# Patient Record
(demographics unavailable — no encounter records)

---

## 2025-06-26 NOTE — HEALTH RISK ASSESSMENT
[No] : In the past 12 months have you used drugs other than those required for medical reasons? No [0] : 2) Feeling down, depressed, or hopeless: Not at all (0) [PHQ-2 Negative - No further assessment needed] : PHQ-2 Negative - No further assessment needed [With Family] : lives with family [Employed] : employed [] :  [Sexually Active] : sexually active [Very Good] : ~his/her~ current health as very good [Good] : ~his/her~  mood as  good [No falls in past year] : Patient reported no falls in the past year [de-identified] : see hpi  [de-identified] : NO exercise [de-identified] : Not super healthy. Tries to control portions. Skips breakfast, lunch: takeout from restaurant, dinner: sometimes skips as well. What the kids eat--pasta. Salad. [[id number]] : 0 [Never] : Never [Patient reported mammogram was normal] : Patient reported mammogram was normal [Patient reported PAP Smear was normal] : Patient reported PAP Smear was normal [HIV test declined] : HIV test declined [Hepatitis C test declined] : Hepatitis C test declined [Change in mental status noted] : No change in mental status noted [None] : None [# Of Children ___] : has [unfilled] children [High Risk Behavior] : no high risk behavior [Feels Safe at Home] : Feels safe at home [Fully functional (bathing, dressing, toileting, transferring, walking, feeding)] : Fully functional (bathing, dressing, toileting, transferring, walking, feeding) [Fully functional (using the telephone, shopping, preparing meals, housekeeping, doing laundry, using] : Fully functional and needs no help or supervision to perform IADLs (using the telephone, shopping, preparing meals, housekeeping, doing laundry, using transportation, managing medications and managing finances) [Reports changes in hearing] : Reports no changes in hearing [Reports changes in vision] : Reports no changes in vision [Reports changes in dental health] : Reports no changes in dental health [Smoke Detector] : smoke detector [Carbon Monoxide Detector] : carbon monoxide detector [Seat Belt] :  uses seat belt [Sunscreen] : uses sunscreen [MammogramDate] : 11/24 [MammogramComments] : by outside GYN [PapSmearDate] : 5/25 [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

## 2025-06-26 NOTE — ASSESSMENT
[Vaccines Reviewed] : Immunizations reviewed today. Please see immunization details in the vaccine log within the immunization flowsheet.  [FreeTextEntry1] : Ms. LEIGH ANN LILLY is a 40 year old female  with history of   BRANDI, fatigue, cold sore presented today for comprehensive evaluation.  #HCM: -mammogram: 11/1/24 Birads 1, denies breast pain, nipple discharge, lymph node issue, breast skin changes,  - Pap smear: P3,  LMP 6/12/25, flows 4-5 days, seen by Dr. Plaza, SAJAN 5/2025. Per pt, PAP/HPV negative. Paragard inserted in 2018.  - HCV, HIV tested - lipids, a1c, CBC, TSH, vitamin B12 today given fatigue - COVID [employer] 8/28/2021, Pfizer 2/16/22, 1 booster, had once COVID disease but mild sx and fully recovered. - Flu shot: did not get this season - tdap: 1/1/24 utd -Hep B vaccine: Heplisav 4/1/25 and 5/1/25.  # hx of reactive depression Caregiver stress for daughter's congenital heart defect and impending unemployment Made pt aware of  our  team resources.   # BRANDI check CBC, Ferritin, TIBC, B12, folate  # occasional cold sore once a year when stressed out.  Valtrex prn works.   # overweight BMI 27.07 Will increase physical exercises and healthier diet.   RTC pending labs, anticipate 1 year

## 2025-06-26 NOTE — PHYSICAL EXAM
[No Acute Distress] : no acute distress [Well Nourished] : well nourished [Well Developed] : well developed [Well-Appearing] : well-appearing [Normal Sclera/Conjunctiva] : normal sclera/conjunctiva [PERRL] : pupils equal round and reactive to light [EOMI] : extraocular movements intact [Normal Outer Ear/Nose] : the outer ears and nose were normal in appearance [Normal Oropharynx] : the oropharynx was normal [Normal TMs] : both tympanic membranes were normal [No Lymphadenopathy] : no lymphadenopathy [No Respiratory Distress] : no respiratory distress  [No Accessory Muscle Use] : no accessory muscle use [Clear to Auscultation] : lungs were clear to auscultation bilaterally [Normal Rate] : normal rate  [Regular Rhythm] : with a regular rhythm [Normal S1, S2] : normal S1 and S2 [No Murmur] : no murmur heard [Pedal Pulses Present] : the pedal pulses are present [No Edema] : there was no peripheral edema [Soft] : abdomen soft [Non Tender] : non-tender [Non-distended] : non-distended [No Masses] : no abdominal mass palpated [No Spinal Tenderness] : no spinal tenderness [Grossly Normal Strength/Tone] : grossly normal strength/tone [No Rash] : no rash [Coordination Grossly Intact] : coordination grossly intact [No Focal Deficits] : no focal deficits [Normal Gait] : normal gait [Normal Affect] : the affect was normal [Normal Insight/Judgement] : insight and judgment were intact [de-identified] : Xerosis noted

## 2025-06-26 NOTE — REVIEW OF SYSTEMS
[FreeTextEntry2] : Cold intolerance [Chills] : no chills [Chest Pain] : no chest pain [Palpitations] : no palpitations [Lower Ext Edema] : no lower extremity edema [Shortness Of Breath] : no shortness of breath [Constipation] : no constipation [Diarrhea] : diarrhea [Melena] : no melena [Skin Rash] : no skin rash [Headache] : no headache [Dizziness] : no dizziness [Negative] : Heme/Lymph [FreeTextEntry8] : Menorrhagia

## 2025-06-26 NOTE — HISTORY OF PRESENT ILLNESS
[FreeTextEntry1] : HERVE/physical [de-identified] : Ms. LEIGH ANN LILLY is a 40 year old female  with history of   BRANDI, fatigue, cold sore presented today for comprehensive evaluation. Last seen by Dr. Qureshi 11/22/22 as a new pt and lost follow up with our practice.   She came alone. She did not have a PCP for a long time with no medical conditions. 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 Her  is also same pharmacist at the same job, and it's stressful for the couple get unemployed with 3 little daughters( age 13, 11 and 9).   Her second daughter born with congenital heart defect  ( bicuspid aortic valve) and got open heart surgery at [facility] at age 4. Aortic valve was leaking and not properly closing, so her pulmonary valve switched to as the aortic valve and artificial valve was placed for the new pulmonary one. She grew up well and stable, yearly sees cardiologist without big issues. She felt moderately depressed when she had to deal with cardiac care for daughter, felt guilty but never got counseling or BH medication. Denied SI/HI and reports feeling fine.   Chronic fatigue persists. Before she lost her medical insurance, she rushed to see her GYN Dr. Plaza, [place] last month: PAP/HPV negative, denies hx of abnormal PAP test or fibroid of uterus.  Her last mammogram was by Dr. Plaza, Birads 1, f/u in 1yr. The couple dose not have any further family plan. She has been using Paraguard for 8 years and recently check up the IUD position.  Hx of left breast abscess after breast feeding for 9months and needs surgical aspiration of 330ml purulent fluid. Took Doxycycline. Denies any subsequent further breast issues or family hx of breast cancer.  -exercise: rarely exercised. Plans to walk -diet: eats healthy, diverse regular diet.  Denied fever, chills,CP, SOB, abdominal pain, n/v/c/d.  Prior 11/22/22 Noting increasing fatigue and hair loss. Denies depressive sx, although notes she lacks motivation sometimes Surgery: None Meds:Biotin,  MVI ,IUD paragard 2018 Family hx: Daughter: Congenital heart disease (bicuspid)// Grandfather: dementia// Grand mother: RA, vertebra compression  Allergies: None

## 2025-06-26 NOTE — PHYSICAL EXAM
[No Acute Distress] : no acute distress [Well Nourished] : well nourished [Well Developed] : well developed [Well-Appearing] : well-appearing [Normal Sclera/Conjunctiva] : normal sclera/conjunctiva [PERRL] : pupils equal round and reactive to light [EOMI] : extraocular movements intact [Normal Outer Ear/Nose] : the outer ears and nose were normal in appearance [Normal Oropharynx] : the oropharynx was normal [Normal TMs] : both tympanic membranes were normal [No Lymphadenopathy] : no lymphadenopathy [No Respiratory Distress] : no respiratory distress  [No Accessory Muscle Use] : no accessory muscle use [Clear to Auscultation] : lungs were clear to auscultation bilaterally [Normal Rate] : normal rate  [Regular Rhythm] : with a regular rhythm [Normal S1, S2] : normal S1 and S2 [No Murmur] : no murmur heard [Pedal Pulses Present] : the pedal pulses are present [No Edema] : there was no peripheral edema [Soft] : abdomen soft [Non Tender] : non-tender [Non-distended] : non-distended [No Masses] : no abdominal mass palpated [No Spinal Tenderness] : no spinal tenderness [Grossly Normal Strength/Tone] : grossly normal strength/tone [No Rash] : no rash [Coordination Grossly Intact] : coordination grossly intact [No Focal Deficits] : no focal deficits [Normal Gait] : normal gait [Normal Affect] : the affect was normal [Normal Insight/Judgement] : insight and judgment were intact [de-identified] : Xerosis noted

## 2025-06-26 NOTE — HISTORY OF PRESENT ILLNESS
[FreeTextEntry1] : HERVE/physical [de-identified] : Ms. LEIGH ANN LILLY is a 40 year old female  with history of   BRANDI, fatigue, cold sore presented today for comprehensive evaluation. Last seen by Dr. Qureshi 11/22/22 as a new pt and lost follow up with our practice.   She came alone. She did not have a PCP for a long time with no medical conditions. 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 Her  is also same pharmacist at the same job, and it's stressful for the couple get unemployed with 3 little daughters( age 13, 11 and 9).   Her second daughter born with congenital heart defect  ( bicuspid aortic valve) and got open heart surgery at [facility] at age 4. Aortic valve was leaking and not properly closing, so her pulmonary valve switched to as the aortic valve and artificial valve was placed for the new pulmonary one. She grew up well and stable, yearly sees cardiologist without big issues. She felt moderately depressed when she had to deal with cardiac care for daughter, felt guilty but never got counseling or BH medication. Denied SI/HI and reports feeling fine.   Chronic fatigue persists. Before she lost her medical insurance, she rushed to see her GYN Dr. Plaza, [place] last month: PAP/HPV negative, denies hx of abnormal PAP test or fibroid of uterus.  Her last mammogram was by Dr. Plaza, Birads 1, f/u in 1yr. The couple dose not have any further family plan. She has been using Paraguard for 8 years and recently check up the IUD position.  Hx of left breast abscess after breast feeding for 9months and needs surgical aspiration of 330ml purulent fluid. Took Doxycycline. Denies any subsequent further breast issues or family hx of breast cancer.  -exercise: rarely exercised. Plans to walk -diet: eats healthy, diverse regular diet.  Denied fever, chills,CP, SOB, abdominal pain, n/v/c/d.  Prior 11/22/22 Noting increasing fatigue and hair loss. Denies depressive sx, although notes she lacks motivation sometimes Surgery: None Meds:Biotin,  MVI ,IUD paragard 2018 Family hx: Daughter: Congenital heart disease (bicuspid)// Grandfather: dementia// Grand mother: RA, vertebra compression  Allergies: None

## 2025-06-26 NOTE — HEALTH RISK ASSESSMENT
[No] : In the past 12 months have you used drugs other than those required for medical reasons? No [0] : 2) Feeling down, depressed, or hopeless: Not at all (0) [PHQ-2 Negative - No further assessment needed] : PHQ-2 Negative - No further assessment needed [With Family] : lives with family [Employed] : employed [] :  [Sexually Active] : sexually active [Very Good] : ~his/her~ current health as very good [Good] : ~his/her~  mood as  good [No falls in past year] : Patient reported no falls in the past year [de-identified] : see hpi  [de-identified] : NO exercise [de-identified] : Not super healthy. Tries to control portions. Skips breakfast, lunch: takeout from restaurant, dinner: sometimes skips as well. What the kids eat--pasta. Salad. [[id number]] : 0 [Never] : Never [Patient reported mammogram was normal] : Patient reported mammogram was normal [Patient reported PAP Smear was normal] : Patient reported PAP Smear was normal [HIV test declined] : HIV test declined [Hepatitis C test declined] : Hepatitis C test declined [Change in mental status noted] : No change in mental status noted [None] : None [# Of Children ___] : has [unfilled] children [High Risk Behavior] : no high risk behavior [Feels Safe at Home] : Feels safe at home [Fully functional (bathing, dressing, toileting, transferring, walking, feeding)] : Fully functional (bathing, dressing, toileting, transferring, walking, feeding) [Fully functional (using the telephone, shopping, preparing meals, housekeeping, doing laundry, using] : Fully functional and needs no help or supervision to perform IADLs (using the telephone, shopping, preparing meals, housekeeping, doing laundry, using transportation, managing medications and managing finances) [Reports changes in hearing] : Reports no changes in hearing [Reports changes in vision] : Reports no changes in vision [Reports changes in dental health] : Reports no changes in dental health [Smoke Detector] : smoke detector [Carbon Monoxide Detector] : carbon monoxide detector [Seat Belt] :  uses seat belt [Sunscreen] : uses sunscreen [MammogramDate] : 11/24 [MammogramComments] : by outside GYN [PapSmearDate] : 5/25 [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

## 2025-06-26 NOTE — ASSESSMENT
[Vaccines Reviewed] : Immunizations reviewed today. Please see immunization details in the vaccine log within the immunization flowsheet.  [FreeTextEntry1] : Ms. LEIGH ANN LILLY is a 40 year old female  with history of   BRANDI, fatigue, cold sore presented today for comprehensive evaluation.  #HCM: -mammogram: 11/1/24 Birads 1, denies breast pain, nipple discharge, lymph node issue, breast skin changes,  - Pap smear: P3,  LMP 6/12/25, flows 4-5 days, seen by Dr. Plaza, SAJAN 5/2025. Per pt, PAP/HPV negative. Paragard inserted in 2018.  - HCV, HIV tested - lipids, a1c, CBC, TSH, vitamin B12 today given fatigue - COVID [employer] 8/28/2021, Pfizer 2/16/22, 1 booster, had once COVID disease but mild sx and fully recovered. - Flu shot: did not get this season - tdap: 1/1/24 utd -Hep B vaccine: Heplisav 4/1/25 and 5/1/25.  # hx of reactive depression Caregiver stress for daughter's congenital heart defect and impending unemployment Made pt aware of  our  team resources.   # BRANDI check CBC, Ferritin, TIBC, B12, folate  # occasional cold sore once a year when stressed out.  Valtrex prn works.   # overweight BMI 27.07 Will increase physical exercises and healthier diet.   RTC pending labs, anticipate 1 year